# Patient Record
Sex: FEMALE | Race: BLACK OR AFRICAN AMERICAN | NOT HISPANIC OR LATINO | Employment: FULL TIME | URBAN - METROPOLITAN AREA
[De-identification: names, ages, dates, MRNs, and addresses within clinical notes are randomized per-mention and may not be internally consistent; named-entity substitution may affect disease eponyms.]

---

## 2020-09-21 ENCOUNTER — TELEPHONE (OUTPATIENT)
Dept: OBSTETRICS AND GYNECOLOGY | Facility: CLINIC | Age: 45
End: 2020-09-21

## 2020-09-21 NOTE — TELEPHONE ENCOUNTER
----- Message from Abdiaziz Goldsmith sent at 9/21/2020 11:17 AM CDT -----  Regarding: Appointment  Contact: MARLYS GÓMEZ [[id number]]  Name of Who is Calling: MARLYS GÓMEZ [[id number]]      What is the request in detail: Would like to speak with staff in regards to an gyn appointment Please advise. No availability for , patient requested to see physician only.       Can the clinic reply by [facility]: no      What Number to Call Back if not [facility]: [phone]

## 2020-11-12 ENCOUNTER — OFFICE VISIT (OUTPATIENT)
Dept: URGENT CARE | Facility: CLINIC | Age: 45
End: 2020-11-12
Payer: MEDICAID

## 2020-11-12 VITALS
HEART RATE: 102 BPM | BODY MASS INDEX: 27.31 KG/M2 | OXYGEN SATURATION: 98 % | WEIGHT: 160 LBS | HEIGHT: 64 IN | DIASTOLIC BLOOD PRESSURE: 127 MMHG | SYSTOLIC BLOOD PRESSURE: 206 MMHG | TEMPERATURE: 98 F

## 2020-11-12 DIAGNOSIS — F51.01 PRIMARY INSOMNIA: ICD-10-CM

## 2020-11-12 DIAGNOSIS — F41.9 ANXIETY: ICD-10-CM

## 2020-11-12 DIAGNOSIS — I10 ESSENTIAL HYPERTENSION, BENIGN: ICD-10-CM

## 2020-11-12 DIAGNOSIS — R07.9 CHEST PAIN, UNSPECIFIED TYPE: Primary | ICD-10-CM

## 2020-11-12 PROCEDURE — 93005 EKG 12-LEAD: ICD-10-PCS | Mod: S$GLB,,, | Performed by: FAMILY MEDICINE

## 2020-11-12 PROCEDURE — 99203 PR OFFICE/OUTPT VISIT, NEW, LEVL III, 30-44 MIN: ICD-10-PCS | Mod: S$GLB,,, | Performed by: FAMILY MEDICINE

## 2020-11-12 PROCEDURE — 93005 ELECTROCARDIOGRAM TRACING: CPT | Mod: S$GLB,,, | Performed by: FAMILY MEDICINE

## 2020-11-12 PROCEDURE — 93010 EKG 12-LEAD: ICD-10-PCS | Mod: S$GLB,,, | Performed by: INTERNAL MEDICINE

## 2020-11-12 PROCEDURE — 93010 ELECTROCARDIOGRAM REPORT: CPT | Mod: S$GLB,,, | Performed by: INTERNAL MEDICINE

## 2020-11-12 PROCEDURE — 99203 OFFICE O/P NEW LOW 30 MIN: CPT | Mod: S$GLB,,, | Performed by: FAMILY MEDICINE

## 2020-11-12 RX ORDER — ALPRAZOLAM 0.5 MG/1
0.5 TABLET ORAL 2 TIMES DAILY PRN
Qty: 30 TABLET | Refills: 0 | Status: SHIPPED | OUTPATIENT
Start: 2020-11-12 | End: 2020-12-12

## 2020-11-12 RX ORDER — CLONIDINE HYDROCHLORIDE 0.1 MG/1
0.1 TABLET ORAL
Status: COMPLETED | OUTPATIENT
Start: 2020-11-12 | End: 2020-11-12

## 2020-11-12 RX ORDER — AMLODIPINE BESYLATE 10 MG/1
10 TABLET ORAL DAILY
Qty: 30 TABLET | Refills: 11 | Status: SHIPPED | OUTPATIENT
Start: 2020-11-12 | End: 2021-11-12

## 2020-11-12 RX ADMIN — CLONIDINE HYDROCHLORIDE 0.1 MG: 0.1 TABLET ORAL at 02:11

## 2020-11-12 NOTE — PATIENT INSTRUCTIONS

## 2020-11-12 NOTE — PROGRESS NOTES
"Subjective:       Patient ID: Mishel Sylvester is a 44 y.o. female.    Vitals:  height is 5' 4" (1.626 m) and weight is 72.6 kg (160 lb). Her temperature is 98.3 °F (36.8 °C). Her blood pressure is 206/127 (abnormal) and her pulse is 102. Her oxygen saturation is 98%.     Chief Complaint: No chief complaint on file.    Chest pain, abdominal pain and trouble sleeping more than a month  44-year-old female with complaint of right-sided shoulder and chest pain headache no longer associated nausea vomiting and under lot of stress straits that she was classified as hypertensive but has never been started on any medication at present she described her chest pain is pressure 2 to 3/10 no radiation no nausea no vomiting  Patient Patient also having under lot of stress at work and also having difficulty sleeping feeling anxious  No history of tobacco abuse no  coronary artery disease or any other medical problems    Chest Pain   This is a new problem. The current episode started more than 1 month ago. The onset quality is gradual. The problem occurs intermittently. The problem has been gradually worsening. The pain is at a severity of 0/10. The patient is experiencing no pain. Associated symptoms include headaches. Pertinent negatives include no cough, diaphoresis, dizziness, fever, nausea, shortness of breath, vomiting or weakness. She has tried nothing for the symptoms. The treatment provided no relief.   Her family medical history is significant for diabetes and hypertension.       Constitution: Negative for chills, sweating, fatigue and fever.   HENT: Negative for tinnitus, facial swelling, congestion, sinus pain and sore throat.    Neck: Negative for neck pain, neck stiffness and painful lymph nodes.   Cardiovascular: Positive for chest pain. Negative for leg swelling.   Eyes: Negative for eye pain, photophobia, vision loss, double vision and blurred vision.   Respiratory: Negative for cough and shortness of breath.  "   Gastrointestinal: Negative for nausea, vomiting and diarrhea.   Genitourinary: Negative for dysuria, frequency, urgency, history of kidney stones and missed menses.   Musculoskeletal: Negative for trauma, joint pain, joint swelling, muscle cramps and muscle ache.   Skin: Negative for color change, pale, rash, wound, lesion and bruising.   Allergic/Immunologic: Negative for seasonal allergies.   Neurological: Positive for headaches. Negative for dizziness, history of vertigo, light-headedness, passing out, facial drooping, speech difficulty, coordination disturbances, loss of balance, history of migraines, disorientation and loss of consciousness.   Hematologic/Lymphatic: Negative for swollen lymph nodes.   Psychiatric/Behavioral: Negative for disorientation, confusion, nervous/anxious, sleep disturbance and depression. The patient is not nervous/anxious.        Objective:      Physical Exam   Constitutional: She is oriented to person, place, and time. She appears well-developed. She is cooperative.  Non-toxic appearance. She does not appear ill. No distress.   HENT:   Head: Normocephalic and atraumatic.   Ears:   Right Ear: Hearing, tympanic membrane, external ear and ear canal normal.   Left Ear: Hearing, tympanic membrane, external ear and ear canal normal.   Nose: Nose normal. No mucosal edema, rhinorrhea or nasal deformity. No epistaxis. Right sinus exhibits no maxillary sinus tenderness and no frontal sinus tenderness. Left sinus exhibits no maxillary sinus tenderness and no frontal sinus tenderness.   Mouth/Throat: Uvula is midline, oropharynx is clear and moist and mucous membranes are normal. No trismus in the jaw. Normal dentition. No uvula swelling. No posterior oropharyngeal erythema.   Eyes: Conjunctivae and lids are normal. Right eye exhibits no discharge. Left eye exhibits no discharge. No scleral icterus.   Neck: Trachea normal, normal range of motion, full passive range of motion without pain and  phonation normal. Neck supple.   Cardiovascular: Normal rate, regular rhythm, normal heart sounds and normal pulses.   Pulmonary/Chest: Effort normal and breath sounds normal. No respiratory distress.   Abdominal: Soft. Normal appearance and bowel sounds are normal. She exhibits no distension, no pulsatile midline mass and no mass. There is no abdominal tenderness.   Musculoskeletal: Normal range of motion.         General: No deformity.   Neurological: She is alert and oriented to person, place, and time. She exhibits normal muscle tone. Coordination normal.   Skin: Skin is warm, dry, intact, not diaphoretic and not pale. Psychiatric: Her speech is normal and behavior is normal. Judgment and thought content normal.   Nursing note and vitals reviewed.        Assessment:       1. Chest pain, unspecified type    2. Essential hypertension, benign    3. Anxiety    4. Primary insomnia        Plan:         Chest pain, unspecified type  -     EKG 12-lead    Essential hypertension, benign  -     EKG 12-lead    Anxiety    Primary insomnia    Other orders  -     cloNIDine tablet 0.1 mg  -     amLODIPine (NORVASC) 10 MG tablet; Take 1 tablet (10 mg total) by mouth once daily.  Dispense: 30 tablet; Refill: 11  -     ALPRAZolam (XANAX) 0.5 MG tablet; Take 1 tablet (0.5 mg total) by mouth 2 (two) times daily as needed for Anxiety.  Dispense: 30 tablet; Refill: 0        patient advised to follow-up with ER/PCP if recurring chest pain shortness of breath nausea vomiting also to follow-up with PCP for recheck of her blood pressure

## 2020-11-13 NOTE — PROGRESS NOTES
Normal sinus rhythm   Voltage criteria for left ventricular hypertrophy   Abnormal ECG   No previous ECGs available   Confirmed by Can Randhawa MD ([id number]) on 11/13/2020 11:32:58 AM

## 2020-11-16 ENCOUNTER — HOSPITAL ENCOUNTER (EMERGENCY)
Facility: HOSPITAL | Age: 45
Discharge: HOME | End: 2020-11-16
Attending: EMERGENCY MEDICINE
Payer: MEDICAID

## 2020-11-16 VITALS
RESPIRATION RATE: 25 BRPM | BODY MASS INDEX: 25.61 KG/M2 | DIASTOLIC BLOOD PRESSURE: 116 MMHG | TEMPERATURE: 98 F | WEIGHT: 150 LBS | HEART RATE: 96 BPM | OXYGEN SATURATION: 100 % | HEIGHT: 64 IN | SYSTOLIC BLOOD PRESSURE: 180 MMHG

## 2020-11-16 DIAGNOSIS — I10 HYPERTENSION, UNSPECIFIED TYPE: ICD-10-CM

## 2020-11-16 DIAGNOSIS — R07.9 CHEST PAIN, UNSPECIFIED TYPE: Primary | ICD-10-CM

## 2020-11-16 DIAGNOSIS — F32.A DEPRESSION, UNSPECIFIED DEPRESSION TYPE: ICD-10-CM

## 2020-11-16 DIAGNOSIS — R07.9 CHEST PAIN: ICD-10-CM

## 2020-11-16 LAB
ALBUMIN SERPL BCP-MCNC: 3.5 G/DL (ref 3.5–5.2)
ALP SERPL-CCNC: 66 U/L (ref 55–135)
ALT SERPL W/O P-5'-P-CCNC: 11 U/L (ref 10–44)
AMPHET+METHAMPHET UR QL: NEGATIVE
ANION GAP SERPL CALC-SCNC: 9 MMOL/L (ref 8–16)
ANISOCYTOSIS BLD QL SMEAR: SLIGHT
AST SERPL-CCNC: 20 U/L (ref 10–40)
BARBITURATES UR QL SCN>200 NG/ML: NEGATIVE
BASOPHILS # BLD AUTO: 0.03 K/UL (ref 0–0.2)
BASOPHILS NFR BLD: 0.4 % (ref 0–1.9)
BENZODIAZ UR QL SCN>200 NG/ML: NEGATIVE
BILIRUB SERPL-MCNC: 0.3 MG/DL (ref 0.1–1)
BNP SERPL-MCNC: <10 PG/ML (ref 0–99)
BUN SERPL-MCNC: 10 MG/DL (ref 6–20)
BZE UR QL SCN: NEGATIVE
CALCIUM SERPL-MCNC: 8.6 MG/DL (ref 8.7–10.5)
CANNABINOIDS UR QL SCN: NEGATIVE
CHLORIDE SERPL-SCNC: 112 MMOL/L (ref 95–110)
CO2 SERPL-SCNC: 19 MMOL/L (ref 23–29)
CREAT SERPL-MCNC: 0.8 MG/DL (ref 0.5–1.4)
CREAT UR-MCNC: 26 MG/DL (ref 15–325)
CTP QC/QA: YES
DIFFERENTIAL METHOD: ABNORMAL
EOSINOPHIL # BLD AUTO: 0.4 K/UL (ref 0–0.5)
EOSINOPHIL NFR BLD: 5.6 % (ref 0–8)
ERYTHROCYTE [DISTWIDTH] IN BLOOD BY AUTOMATED COUNT: 15.8 % (ref 11.5–14.5)
EST. GFR  (AFRICAN AMERICAN): >60 ML/MIN/1.73 M^2
EST. GFR  (NON AFRICAN AMERICAN): >60 ML/MIN/1.73 M^2
ETHANOL SERPL-MCNC: <10 MG/DL
GLUCOSE SERPL-MCNC: 94 MG/DL (ref 70–110)
HCT VFR BLD AUTO: 35.1 % (ref 37–48.5)
HGB BLD-MCNC: 10.6 G/DL (ref 12–16)
IMM GRANULOCYTES # BLD AUTO: 0.04 K/UL (ref 0–0.04)
IMM GRANULOCYTES NFR BLD AUTO: 0.5 % (ref 0–0.5)
LYMPHOCYTES # BLD AUTO: 2.4 K/UL (ref 1–4.8)
LYMPHOCYTES NFR BLD: 32.1 % (ref 18–48)
MCH RBC QN AUTO: 21.5 PG (ref 27–31)
MCHC RBC AUTO-ENTMCNC: 30.2 G/DL (ref 32–36)
MCV RBC AUTO: 71 FL (ref 82–98)
METHADONE UR QL SCN>300 NG/ML: NEGATIVE
MONOCYTES # BLD AUTO: 0.6 K/UL (ref 0.3–1)
MONOCYTES NFR BLD: 8.4 % (ref 4–15)
NEUTROPHILS # BLD AUTO: 3.9 K/UL (ref 1.8–7.7)
NEUTROPHILS NFR BLD: 53 % (ref 38–73)
NRBC BLD-RTO: 0 /100 WBC
OPIATES UR QL SCN: NEGATIVE
PCP UR QL SCN>25 NG/ML: NEGATIVE
PLATELET # BLD AUTO: 193 K/UL (ref 150–350)
PLATELET BLD QL SMEAR: ABNORMAL
PMV BLD AUTO: 10.2 FL (ref 9.2–12.9)
POTASSIUM SERPL-SCNC: 3.5 MMOL/L (ref 3.5–5.1)
PROT SERPL-MCNC: 7 G/DL (ref 6–8.4)
RBC # BLD AUTO: 4.93 M/UL (ref 4–5.4)
SARS-COV-2 RDRP RESP QL NAA+PROBE: NEGATIVE
SODIUM SERPL-SCNC: 140 MMOL/L (ref 136–145)
TOXICOLOGY INFORMATION: NORMAL
TROPONIN I SERPL DL<=0.01 NG/ML-MCNC: 0.01 NG/ML (ref 0–0.03)
TROPONIN I SERPL DL<=0.01 NG/ML-MCNC: 0.01 NG/ML (ref 0–0.03)
TSH SERPL DL<=0.005 MIU/L-ACNC: 1.92 UIU/ML (ref 0.4–4)
WBC # BLD AUTO: 7.38 K/UL (ref 3.9–12.7)

## 2020-11-16 PROCEDURE — 80320 DRUG SCREEN QUANTALCOHOLS: CPT

## 2020-11-16 PROCEDURE — 93005 ELECTROCARDIOGRAM TRACING: CPT

## 2020-11-16 PROCEDURE — 83880 ASSAY OF NATRIURETIC PEPTIDE: CPT

## 2020-11-16 PROCEDURE — 80053 COMPREHEN METABOLIC PANEL: CPT

## 2020-11-16 PROCEDURE — 84443 ASSAY THYROID STIM HORMONE: CPT

## 2020-11-16 PROCEDURE — 80307 DRUG TEST PRSMV CHEM ANLYZR: CPT

## 2020-11-16 PROCEDURE — U0002 COVID-19 LAB TEST NON-CDC: HCPCS | Performed by: EMERGENCY MEDICINE

## 2020-11-16 PROCEDURE — 93010 EKG 12-LEAD: ICD-10-PCS | Mod: ,,, | Performed by: INTERNAL MEDICINE

## 2020-11-16 PROCEDURE — 85025 COMPLETE CBC W/AUTO DIFF WBC: CPT

## 2020-11-16 PROCEDURE — 99285 EMERGENCY DEPT VISIT HI MDM: CPT | Mod: ,,, | Performed by: EMERGENCY MEDICINE

## 2020-11-16 PROCEDURE — 99284 EMERGENCY DEPT VISIT MOD MDM: CPT | Mod: 25

## 2020-11-16 PROCEDURE — 93010 ELECTROCARDIOGRAM REPORT: CPT | Mod: ,,, | Performed by: INTERNAL MEDICINE

## 2020-11-16 PROCEDURE — 84484 ASSAY OF TROPONIN QUANT: CPT

## 2020-11-16 PROCEDURE — 99285 PR EMERGENCY DEPT VISIT,LEVEL V: ICD-10-PCS | Mod: ,,, | Performed by: EMERGENCY MEDICINE

## 2020-11-16 RX ORDER — ASPIRIN 325 MG
325 TABLET ORAL
Status: DISCONTINUED | OUTPATIENT
Start: 2020-11-16 | End: 2020-11-16
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
## 2020-11-16 NOTE — DISCHARGE INSTRUCTIONS
[facility] Outpatient Clinics  - [facility]: [address] [place] [address], [phone]  - [place] [facility]: [address]  [facility], [phone]  - [facility]: [address], [place], [phone]  - [facility]: [address]  [facility], [phone]  - [facility] ([facility]): [address], [phone]  - [facility] ([facility]): [address] [facility] [address], [phone]  - [facility] ([place]): [phone]  - [facility] [phone]     [facility] and Private Outpatient Clinics  - [facility]: [address] 4th floor, [phone]  - [facility]: [address] [facility] ([facility], 3rd Floor)[facility], [phone]  - [facility]: [address], [phone], [phone]  - [facility]: [address], [address], [phone]    Outpatient Group Therapy  - Cancer Support Group: [facility], [address], [place], [phone]  - Depression/Bipolar Support [place]: [facility], [address], [place], 7:30pm 1st & 3rd Tuesdays in cafeteria, [phone]  - Heart Transplant Support Group: [facility], 3rd Wednesday, 7th floor conference room, [place]ha Garrick, [phone]  - [facility] ([facility]): [address], [place], [phone]  - [facility]: [facility] room 458, [place], [phone]    Placement/Shelters:    - [facility]: - [address], [place] [address] - Call [phone]   - [facility]  Medicaid funded, call [phone]   - [facility] Housing: family transitional housing, [address], call for intake appt, [phone],   - [facility], Outreach and Housing Placement, [address], [place],  Sister Mirna Johnson MSW, [phone]  - [facility] [employer] [facility]: [address] [facility], [phone]  - [facility] (16-24 year old): [address] [facility] [address] [facility], [phone]  - [facility]: [address]  [phone]  - [facility], [address] [phone]  - [facility] (women only): [address], [phone]    Health Clinics / Dental Clinics / Indigent Pharmacy  - [facility]: [address] [facility], [phone]  - [facility]: [address] [facility] [address], [phone], Mon-Thur 9am-4pm, Fri 9am-12pm  - [facility]: [address] [facility], [phone]  - [facility]: [address] [place] [address], [phone], fax [phone]  - [facility] (dental): [address] [place] [address]  [phone]  - [facility] STD Clinic, [address] [facility] [address] [facility], [phone]  - [facility] (dental): [address], [phone]  - [facility] (Ph[facility]acy): [address], [address], ([facility]), [phone], Mon/Wed 8am-1pm    Crisis  - [facility] Hotline, [phone]  - National Suicide Prevention Crisis Hotline: 553.611.4865    Community Resources  - [place], dial [phone]

## 2020-11-16 NOTE — ED NOTES
MD Brian aware of previous SI. Pt denies SI at this time. Pt reports she had intention of carrying out her plan last week.
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
## 2020-11-16 NOTE — PROVIDER PROGRESS NOTES - EMERGENCY DEPT.
Encounter Date: 11/16/2020    ED Physician Progress Notes           ED Attending Sign-out Progress Note:  Patient signed out to me at shift change by Dr. Torres to f/u labs and to consult psych and determine overall disposition.    EKG (my interpretation):  Normal sinus rhythm rate 97.  No ischemic changes.  No arrhythmia.  No STEMI.    Labs reviewed; grossly WNL w/o actionable values. Troponin negative x 2.    HEART score 2 which supports discharge home without need for provocative testing.     Discussed with psychiatry who evaluated patient.  They do not believe patient is an acute threat to self or others.  They not recommended PEC at this time.  They provided resources for community follow-up for patient.      Stable for discharge at this time.  Return precautions discussed.    ED Clinical Impression:    ICD-10-CM ICD-9-CM   1. Chest pain, unspecified type  R07.9 786.50   2. Chest pain  R07.9 786.50   3. Depression, unspecified depression type  F32.9 311   4. Hypertension, unspecified type  I10 401.9

## 2020-11-16 NOTE — ED NOTES
ED Social Workers (SW) entered pt's room with permission. Pt is standing in room. Pt confirmed identifiers. SW consulted to provide outpatient mental health resources.     Provided pt with a list of resources. Pt reports that a therapist contacted her but is hesitant about tele kev sessions due to privacy concerns. Pt reports that she has roommates. SW and pt discussed problem solving solutions to create privacy during sessions. (park, scheduling sessions when roommates are at work, private rooms of libraries, etc.)    Pt verbalized understanding.     ED RN notified.     - Adriana Ruiz LMSW   [phone]

## 2020-11-16 NOTE — ED NOTES
D/c instructions reviewed with patient. Pt expressed some concern about receiving help from a therapist at this time. SW consulted for resources and discussing options with the pt.